# Patient Record
Sex: MALE | Race: WHITE | Employment: OTHER | URBAN - METROPOLITAN AREA
[De-identification: names, ages, dates, MRNs, and addresses within clinical notes are randomized per-mention and may not be internally consistent; named-entity substitution may affect disease eponyms.]

---

## 2019-10-28 ENCOUNTER — ANESTHESIA (OUTPATIENT)
Dept: ENDOSCOPY | Age: 79
End: 2019-10-28
Payer: MEDICARE

## 2019-10-28 ENCOUNTER — ANESTHESIA EVENT (OUTPATIENT)
Dept: ENDOSCOPY | Age: 79
End: 2019-10-28
Payer: MEDICARE

## 2019-10-28 ENCOUNTER — HOSPITAL ENCOUNTER (OUTPATIENT)
Age: 79
Setting detail: OUTPATIENT SURGERY
Discharge: HOME | End: 2019-10-28
Attending: SPECIALIST | Admitting: SPECIALIST
Payer: MEDICARE

## 2019-10-28 VITALS
HEIGHT: 70 IN | BODY MASS INDEX: 27.49 KG/M2 | SYSTOLIC BLOOD PRESSURE: 113 MMHG | TEMPERATURE: 97.8 F | DIASTOLIC BLOOD PRESSURE: 72 MMHG | OXYGEN SATURATION: 100 % | WEIGHT: 192 LBS | RESPIRATION RATE: 18 BRPM | HEART RATE: 73 BPM

## 2019-10-28 PROCEDURE — 74011250636 HC RX REV CODE- 250/636: Performed by: SPECIALIST

## 2019-10-28 PROCEDURE — 76040000007: Performed by: SPECIALIST

## 2019-10-28 PROCEDURE — 77030029384 HC SNR POLYP CAPTVR II BSC -B: Performed by: SPECIALIST

## 2019-10-28 PROCEDURE — 77030021593 HC FCPS BIOP ENDOSC BSC -A: Performed by: SPECIALIST

## 2019-10-28 PROCEDURE — 74011250636 HC RX REV CODE- 250/636: Performed by: NURSE ANESTHETIST, CERTIFIED REGISTERED

## 2019-10-28 PROCEDURE — 74011000250 HC RX REV CODE- 250: Performed by: NURSE ANESTHETIST, CERTIFIED REGISTERED

## 2019-10-28 PROCEDURE — 88305 TISSUE EXAM BY PATHOLOGIST: CPT

## 2019-10-28 PROCEDURE — 76060000032 HC ANESTHESIA 0.5 TO 1 HR: Performed by: SPECIALIST

## 2019-10-28 RX ORDER — LOSARTAN POTASSIUM AND HYDROCHLOROTHIAZIDE 12.5; 1 MG/1; MG/1
1 TABLET ORAL DAILY
COMMUNITY

## 2019-10-28 RX ORDER — SODIUM CHLORIDE 9 MG/ML
50 INJECTION, SOLUTION INTRAVENOUS CONTINUOUS
Status: DISCONTINUED | OUTPATIENT
Start: 2019-10-28 | End: 2019-10-28 | Stop reason: HOSPADM

## 2019-10-28 RX ORDER — SODIUM CHLORIDE 0.9 % (FLUSH) 0.9 %
5-40 SYRINGE (ML) INJECTION EVERY 8 HOURS
Status: DISCONTINUED | OUTPATIENT
Start: 2019-10-28 | End: 2019-10-28 | Stop reason: HOSPADM

## 2019-10-28 RX ORDER — PROPOFOL 10 MG/ML
INJECTION, EMULSION INTRAVENOUS AS NEEDED
Status: DISCONTINUED | OUTPATIENT
Start: 2019-10-28 | End: 2019-10-28 | Stop reason: HOSPADM

## 2019-10-28 RX ORDER — LIDOCAINE HYDROCHLORIDE 20 MG/ML
INJECTION, SOLUTION EPIDURAL; INFILTRATION; INTRACAUDAL; PERINEURAL AS NEEDED
Status: DISCONTINUED | OUTPATIENT
Start: 2019-10-28 | End: 2019-10-28 | Stop reason: HOSPADM

## 2019-10-28 RX ORDER — SODIUM CHLORIDE 0.9 % (FLUSH) 0.9 %
5-40 SYRINGE (ML) INJECTION AS NEEDED
Status: DISCONTINUED | OUTPATIENT
Start: 2019-10-28 | End: 2019-10-28 | Stop reason: HOSPADM

## 2019-10-28 RX ORDER — FENTANYL CITRATE 50 UG/ML
12.5-5 INJECTION, SOLUTION INTRAMUSCULAR; INTRAVENOUS
Status: DISCONTINUED | OUTPATIENT
Start: 2019-10-28 | End: 2019-10-28 | Stop reason: HOSPADM

## 2019-10-28 RX ORDER — DEXTROMETHORPHAN/PSEUDOEPHED 2.5-7.5/.8
1.2 DROPS ORAL
Status: DISCONTINUED | OUTPATIENT
Start: 2019-10-28 | End: 2019-10-28 | Stop reason: HOSPADM

## 2019-10-28 RX ORDER — ASPIRIN 81 MG/1
81 TABLET ORAL DAILY
COMMUNITY

## 2019-10-28 RX ORDER — EPINEPHRINE 0.1 MG/ML
1 INJECTION INTRACARDIAC; INTRAVENOUS
Status: DISCONTINUED | OUTPATIENT
Start: 2019-10-28 | End: 2019-10-28 | Stop reason: HOSPADM

## 2019-10-28 RX ORDER — SODIUM CHLORIDE 9 MG/ML
INJECTION, SOLUTION INTRAVENOUS
Status: DISCONTINUED | OUTPATIENT
Start: 2019-10-28 | End: 2019-10-28 | Stop reason: HOSPADM

## 2019-10-28 RX ORDER — NALOXONE HYDROCHLORIDE 0.4 MG/ML
0.4 INJECTION, SOLUTION INTRAMUSCULAR; INTRAVENOUS; SUBCUTANEOUS
Status: DISCONTINUED | OUTPATIENT
Start: 2019-10-28 | End: 2019-10-28 | Stop reason: HOSPADM

## 2019-10-28 RX ORDER — ATORVASTATIN CALCIUM 20 MG/1
20 TABLET, FILM COATED ORAL DAILY
COMMUNITY

## 2019-10-28 RX ORDER — FLUMAZENIL 0.1 MG/ML
0.2 INJECTION INTRAVENOUS
Status: DISCONTINUED | OUTPATIENT
Start: 2019-10-28 | End: 2019-10-28 | Stop reason: HOSPADM

## 2019-10-28 RX ORDER — ATROPINE SULFATE 0.1 MG/ML
0.5 INJECTION INTRAVENOUS
Status: DISCONTINUED | OUTPATIENT
Start: 2019-10-28 | End: 2019-10-28 | Stop reason: HOSPADM

## 2019-10-28 RX ORDER — MIDAZOLAM HYDROCHLORIDE 1 MG/ML
.25-5 INJECTION, SOLUTION INTRAMUSCULAR; INTRAVENOUS
Status: DISCONTINUED | OUTPATIENT
Start: 2019-10-28 | End: 2019-10-28 | Stop reason: HOSPADM

## 2019-10-28 RX ORDER — AMLODIPINE BESYLATE 5 MG/1
5 TABLET ORAL DAILY
COMMUNITY

## 2019-10-28 RX ADMIN — PROPOFOL 20 MG: 10 INJECTION, EMULSION INTRAVENOUS at 09:20

## 2019-10-28 RX ADMIN — PROPOFOL 20 MG: 10 INJECTION, EMULSION INTRAVENOUS at 09:23

## 2019-10-28 RX ADMIN — PROPOFOL 30 MG: 10 INJECTION, EMULSION INTRAVENOUS at 09:35

## 2019-10-28 RX ADMIN — PROPOFOL 50 MG: 10 INJECTION, EMULSION INTRAVENOUS at 09:41

## 2019-10-28 RX ADMIN — PROPOFOL 20 MG: 10 INJECTION, EMULSION INTRAVENOUS at 09:21

## 2019-10-28 RX ADMIN — PROPOFOL 20 MG: 10 INJECTION, EMULSION INTRAVENOUS at 09:22

## 2019-10-28 RX ADMIN — PROPOFOL 40 MG: 10 INJECTION, EMULSION INTRAVENOUS at 09:26

## 2019-10-28 RX ADMIN — LIDOCAINE HYDROCHLORIDE 80 MG: 20 INJECTION, SOLUTION EPIDURAL; INFILTRATION; INTRACAUDAL; PERINEURAL at 09:18

## 2019-10-28 RX ADMIN — PROPOFOL 80 MG: 10 INJECTION, EMULSION INTRAVENOUS at 09:18

## 2019-10-28 RX ADMIN — PROPOFOL 40 MG: 10 INJECTION, EMULSION INTRAVENOUS at 09:38

## 2019-10-28 RX ADMIN — PROPOFOL 20 MG: 10 INJECTION, EMULSION INTRAVENOUS at 09:24

## 2019-10-28 RX ADMIN — SODIUM CHLORIDE: 900 INJECTION, SOLUTION INTRAVENOUS at 09:12

## 2019-10-28 RX ADMIN — PROPOFOL 40 MG: 10 INJECTION, EMULSION INTRAVENOUS at 09:32

## 2019-10-28 RX ADMIN — PROPOFOL 20 MG: 10 INJECTION, EMULSION INTRAVENOUS at 09:19

## 2019-10-28 NOTE — ROUTINE PROCESS
Rosy Jose 1940 
[id number] Situation: 
Verbal report received from: JOSEPH Prakash Procedure: Procedure(s): 
COLONOSCOPY 
ENDOSCOPIC POLYPECTOMY Background: 
 
Preoperative diagnosis: FAMILY HISTORY OF MALIGNANT NEOPLASM OF GASTROINTESTINAL TRACT Postoperative diagnosis: 1.transverse colon polyps x 2 2.severe sigmoid diverticulosis 3.descending colon polyp :  Dr. Janine Anaya Assistant(s): Endoscopy Technician-1: Maxi Borjas Endoscopy RN-1: Erwin Sung Specimens:  
ID Type Source Tests Collected by Time Destination 1 : transverse colon polyp Preservative Colon, Transverse  Haim Tanner MD 10/28/2019 6053 Pathology 2 : descending colon polyp Preservative Colon, Descending  Haim Tanner MD 10/28/2019 7759 Pathology H. Pylori  no Assessment: 
Intra-procedure medications Anesthesia gave intra-procedure sedation and medications, see anesthesia flow sheet yes Intravenous fluids: NS@ Thora Haagensen Vital signs stable Abdominal assessment: round and soft Recommendation: 
Discharge patient per MD order. Family or Friend Permission to share finding with family or friend yes

## 2019-10-28 NOTE — H&P
Pre-endoscopy H and P for Colonoscopy    The patient was seen and examined. Date of last colonoscopy: 2013, Polyps  No      The airway was assessed and documented. The problem list, past medical history, and medications were reviewed. There is no problem list on file for this patient. Social History     Socioeconomic History    Marital status: SINGLE     Spouse name: Not on file    Number of children: Not on file    Years of education: Not on file    Highest education level: Not on file   Occupational History    Not on file   Social Needs    Financial resource strain: Not on file    Food insecurity:     Worry: Not on file     Inability: Not on file    Transportation needs:     Medical: Not on file     Non-medical: Not on file   Tobacco Use    Smoking status: Not on file   Substance and Sexual Activity    Alcohol use: Not on file    Drug use: Not on file    Sexual activity: Not on file   Lifestyle    Physical activity:     Days per week: Not on file     Minutes per session: Not on file    Stress: Not on file   Relationships    Social connections:     Talks on phone: Not on file     Gets together: Not on file     Attends [employer] service: Not on file     Active member of club or organization: Not on file     Attends meetings of clubs or organizations: Not on file     Relationship status: Not on file    Intimate partner violence:     Fear of current or ex partner: Not on file     Emotionally abused: Not on file     Physically abused: Not on file     Forced sexual activity: Not on file   Other Topics Concern    Not on file   Social History Narrative    Not on file     Past Medical History:   Diagnosis Date    Hypertension      The patient has a family history of colon ca    Prior to Admission Medications   Prescriptions Last Dose Informant Patient Reported? Taking? amLODIPine (NORVASC) 5 mg tablet 10/28/2019 at Unknown time  Yes Yes   Sig: Take 5 mg by mouth daily.    aspirin delayed-release 81 mg tablet 10/27/2019 at Unknown time  Yes Yes   Sig: Take 81 mg by mouth daily. atorvastatin (LIPITOR) 20 mg tablet 10/27/2019 at Unknown time  Yes Yes   Sig: Take 20 mg by mouth daily. losartan-hydroCHLOROthiazide (HYZAAR) 100-12.5 mg per tablet 10/28/2019 at Unknown time  Yes Yes   Sig: Take 1 Tab by mouth daily. Facility-Administered Medications: None         The review of systems is:  negative for shortness of breath or chest pain      The heart, lungs and mental status were satisfactory for the administration of MAC sedation and for the procedure. Mallampati score: See Anesthesia. I discussed with the patient the objectives, risks, consequences and alternatives to the procedure. Plan: Endoscopic procedure with MAC sedation.     Lebron Bryson MD  10/28/2019  9:11 AM

## 2019-10-28 NOTE — PROCEDURES
Colonoscopy Procedure Note    Indications:   Family history of coloretal cancer (screening only)  Referring Physician: Sesar Blair, Not On File   Anesthesia/Sedation:MAC  Endoscopist:  Dr. Solitario Clay  Assistant:  Endoscopy Technician-1: Rosita GARCIA  Endoscopy RN-1: Edin Oleary  Surgical Assistant: None  Implants: None    Preoperative diagnosis: FAMILY HISTORY OF MALIGNANT NEOPLASM OF GASTROINTESTINAL TRACT    Postoperative diagnosis: 1.transverse colon polyps x 2  2.severe sigmoid diverticulosis  3.descending colon polyp      Procedure in Detail:  Informed consent was obtained for the procedure, including sedation. Risks of perforation, hemorrhage, adverse drug reaction, and aspiration were discussed. The patient was placed in the left lateral decubitus position. Based on the pre-procedure assessment, including review of the patient's medical history, medications, allergies, and review of systems, he had been deemed to be an appropriate candidate for moderate sedation; he was therefore sedated with the medications listed above. The patient was monitored continuously with ECG tracing, pulse oximetry, blood pressure monitoring, and direct observations. A rectal examination was performed. The [id number] was inserted into the rectum and advanced under direct vision to the terminal ileum. The quality of the colonic preparation was excellent. A careful inspection was made as the colonoscope was withdrawn, including a retroflexed view of the rectum; findings and interventions are described below. Findings:   Rectum: normal  Sigmoid: severe diverticulosis;   Descending Colon: moderate diverticulosis; 2 mm polyp removed with cold snare  Transverse Colon: 2 mm polyp removed with cold forceps and 3 mm polyp removed with cold snare  Ascending Colon: normal  Cecum: normal  Terminal Ileum: normal    Specimens:     see above    EBL: None    Complications: None; patient tolerated the procedure well.      Recommendations:     - Await pathology. - If adenoma is present, repeat colonoscopy in 5 years.      - If < 10 years, reason: above average risk patient    Signed By: Didier Parra MD                        October 28, 2019

## 2019-10-28 NOTE — ANESTHESIA PREPROCEDURE EVALUATION
Relevant Problems   No relevant active problems       Anesthetic History   No history of anesthetic complications            Review of Systems / Medical History  Patient summary reviewed, nursing notes reviewed and pertinent labs reviewed    Pulmonary  Within defined limits                 Neuro/Psych   Within defined limits           Cardiovascular    Hypertension                   GI/Hepatic/Renal                Endo/Other             Other Findings              Physical Exam    Airway  Mallampati: I  TM Distance: > 6 cm  Neck ROM: normal range of motion   Mouth opening: Normal     Cardiovascular    Rhythm: regular  Rate: normal         Dental  No notable dental hx       Pulmonary  Breath sounds clear to auscultation               Abdominal         Other Findings            Anesthetic Plan    ASA: 2  Anesthesia type: MAC          Induction: Intravenous  Anesthetic plan and risks discussed with: Patient

## 2019-10-28 NOTE — PERIOP NOTES

## 2019-10-28 NOTE — ANESTHESIA POSTPROCEDURE EVALUATION
Post-Anesthesia Evaluation and Assessment    Patient: Lianna Ovalle MRN: [id number]  SSN: [id number]    YOB: 1940  Age: 67 y.o. Sex: male      I have evaluated the patient and they are stable and ready for discharge from the PACU. Cardiovascular Function/Vital Signs  Visit Vitals  /72   Pulse 73   Temp 36.6 °C (97.8 °F)   Resp 18   Ht 5' 10\" (1.778 m)   Wt 87.1 kg (192 lb)   SpO2 100%   BMI 27.55 kg/m²       Patient is status post MAC anesthesia for Procedure(s):  COLONOSCOPY  ENDOSCOPIC POLYPECTOMY. Nausea/Vomiting: None    Postoperative hydration reviewed and adequate. Pain:  Pain Scale 1: Numeric (0 - 10) (10/28/19 1009)  Pain Intensity 1: 0 (10/28/19 1009)   Managed    Neurological Status: At baseline    Mental Status, Level of Consciousness: Alert and  oriented to person, place, and time    Pulmonary Status:   O2 Device: Room air (10/28/19 1009)   Adequate oxygenation and airway patent    Complications related to anesthesia: None    Post-anesthesia assessment completed. No concerns    Signed By: Radha Schafer MD     October 28, 2019              Procedure(s):  COLONOSCOPY  ENDOSCOPIC POLYPECTOMY. MAC    <BSHSIANPOST>    Vitals Value Taken Time   BP 0/0 10/28/2019 10:19 AM   Temp 36.6 °C (97.8 °F) 10/28/2019  9:58 AM   Pulse 0 10/28/2019 10:19 AM   Resp 0 10/28/2019 10:22 AM   SpO2 100 % 10/28/2019 10:16 AM   Vitals shown include unvalidated device data.

## 2019-10-28 NOTE — DISCHARGE INSTRUCTIONS
Bethany Reeves  [id number]  1940    COLON DISCHARGE INSTRUCTIONS  Discomfort:  Redness at IV site- apply warm compress to area; if redness or soreness persist- contact your physician  There may be a slight amount of blood passed from the rectum  Gaseous discomfort- walking, belching will help relieve any discomfort  You may not operate a vehicle for 12 hours  You may not engage in an occupation involving machinery or appliances for rest of today  You may not drink alcoholic beverages for at least 12 hours  Avoid making any critical decisions for at least 24 hour  DIET:   Regular diet. - however -  remember your colon is empty and a heavy meal will produce gas. Avoid these foods:  vegetables, fried / greasy foods, carbonated drinks for today. ACTIVITY:  You may resume your normal daily activities it is recommended that you spend the remainder of the day resting -  avoid any strenuous activity. CALL M.D. ANY SIGN OF:  Increasing pain, nausea, vomiting  Abdominal distension (swelling)  New increased bleeding (oral or rectal)  Fever (chills)  Pain in chest area  Bloody discharge from nose or mouth  Shortness of breath    You may not  take any Advil, Aspirin, Ibuprofen, Motrin, Aleve, Goodys, or any similar pain or arthritis products for 3 days, ONLY  Tylenol as needed for pain. Follow-up Instructions:   Call Dr. Kalia Vaughan  Results of procedure / biopsy in 10-14days   Office telephone for problems or questions [phone]    - Await pathology. - If adenoma is present, repeat colonoscopy in 5 years. - If < 10 years, reason: above average risk patient    Patient Education        Colon Polyps: Care Instructions  Your Care Instructions    Colon polyps are growths in the colon or the rectum. The cause of most colon polyps is not known, and most people who get them do not have any problems. But a certain kind can turn into cancer.  For this reason, regular testing for colon polyps is important for people as they get older. It is also important for anyone who has an increased risk for colon cancer. Polyps are usually found through routine colon cancer screening tests. Although most colon polyps are not cancerous, they are usually removed and then tested for cancer. Screening for colon cancer saves lives because the cancer can usually be cured if it is caught early. If you have a polyp that is the type that can turn into cancer, you may need more tests to examine your entire colon. The doctor will remove any other polyps that he or she finds, and you will be tested more often. Follow-up care is a key part of your treatment and safety. Be sure to make and go to all appointments, and call your doctor if you are having problems. It's also a good idea to know your test results and keep a list of the medicines you take. How can you care for yourself at home? Regular exams to look for colon polyps are the best way to prevent polyps from turning into colon cancer. These can include stool tests, sigmoidoscopy, colonoscopy, and CT colonography. Talk with your doctor about a testing schedule that is right for you. To prevent polyps  There is no home treatment that can prevent colon polyps. But these steps may help lower your risk for cancer. · Stay active. Being active can help you get to and stay at a healthy weight. Try to exercise on most days of the week. Walking is a good choice. · Eat well. Choose a variety of vegetables, fruits, legumes (such as peas and beans), fish, poultry, and whole grains. · Do not smoke. If you need help quitting, talk to your doctor about stop-smoking programs and medicines. These can increase your chances of quitting for good. · If you drink alcohol, limit how much you drink. Limit alcohol to 2 drinks a day for men and 1 drink a day for women. When should you call for help?   Call your doctor now or seek immediate medical care if:    · You have severe belly pain.     · Your stools are maroon or very bloody.    Watch closely for changes in your health, and be sure to contact your doctor if:    · You have a fever.     · You have nausea or vomiting.     · You have a change in bowel habits (new constipation or diarrhea).     · Your symptoms get worse or are not improving as expected. Where can you learn more? Go to http://ja-deidra.info/. Enter 95 [id number] in the search box to learn more about \"Colon Polyps: Care Instructions. \"  Current as of: December 19, 2018  Content Version: 12.2  © 8235-8214 [employer]. Care instructions adapted under license by [employer] (which disclaims liability or warranty for this information). If you have questions about a medical condition or this instruction, always ask your healthcare professional. [address] any warranty or liability for your use of this information.

## 2020-02-12 ENCOUNTER — APPOINTMENT (OUTPATIENT)
Dept: CT IMAGING | Age: 80
DRG: 378 | End: 2020-02-12
Attending: EMERGENCY MEDICINE
Payer: MEDICARE

## 2020-02-12 ENCOUNTER — HOSPITAL ENCOUNTER (INPATIENT)
Age: 80
LOS: 2 days | Discharge: HOME | DRG: 378 | End: 2020-02-14
Attending: EMERGENCY MEDICINE | Admitting: FAMILY MEDICINE
Payer: MEDICARE

## 2020-02-12 DIAGNOSIS — K92.1 MELENA: Primary | ICD-10-CM

## 2020-02-12 DIAGNOSIS — K57.90 DIVERTICULOSIS: ICD-10-CM

## 2020-02-12 PROBLEM — R10.9 ABDOMINAL PAIN: Status: ACTIVE | Noted: 2020-02-12

## 2020-02-12 LAB
ALBUMIN SERPL-MCNC: 4.1 G/DL (ref 3.5–5)
ALBUMIN/GLOB SERPL: 1.1 {RATIO} (ref 1.1–2.2)
ALP SERPL-CCNC: 76 U/L (ref 45–117)
ALT SERPL-CCNC: 40 U/L (ref 12–78)
ANION GAP SERPL CALC-SCNC: 10 MMOL/L (ref 5–15)
AST SERPL-CCNC: 27 U/L (ref 15–37)
BASOPHILS # BLD: 0.1 K/UL (ref 0–0.1)
BASOPHILS NFR BLD: 1 % (ref 0–1)
BILIRUB SERPL-MCNC: 0.9 MG/DL (ref 0.2–1)
BUN SERPL-MCNC: 10 MG/DL (ref 6–20)
BUN/CREAT SERPL: 11 (ref 12–20)
CALCIUM SERPL-MCNC: 8.9 MG/DL (ref 8.5–10.1)
CHLORIDE SERPL-SCNC: 101 MMOL/L (ref 97–108)
CO2 SERPL-SCNC: 30 MMOL/L (ref 21–32)
COMMENT, HOLDF: NORMAL
CREAT SERPL-MCNC: 0.94 MG/DL (ref 0.7–1.3)
DIFFERENTIAL METHOD BLD: ABNORMAL
EOSINOPHIL # BLD: 0.3 K/UL (ref 0–0.4)
EOSINOPHIL NFR BLD: 3 % (ref 0–7)
ERYTHROCYTE [DISTWIDTH] IN BLOOD BY AUTOMATED COUNT: 12.5 % (ref 11.5–14.5)
GLOBULIN SER CALC-MCNC: 3.8 G/DL (ref 2–4)
GLUCOSE SERPL-MCNC: 109 MG/DL (ref 65–100)
HCT VFR BLD AUTO: 39 % (ref 36.6–50.3)
HCT VFR BLD AUTO: 45.6 % (ref 36.6–50.3)
HEMOCCULT STL QL: POSITIVE
HGB BLD-MCNC: 13.2 G/DL (ref 12.1–17)
HGB BLD-MCNC: 15 G/DL (ref 12.1–17)
IMM GRANULOCYTES # BLD AUTO: 0 K/UL (ref 0–0.04)
IMM GRANULOCYTES NFR BLD AUTO: 1 % (ref 0–0.5)
INR PPP: 1 (ref 0.9–1.1)
LIPASE SERPL-CCNC: 235 U/L (ref 73–393)
LYMPHOCYTES # BLD: 1.2 K/UL (ref 0.8–3.5)
LYMPHOCYTES NFR BLD: 14 % (ref 12–49)
MCH RBC QN AUTO: 32.5 PG (ref 26–34)
MCHC RBC AUTO-ENTMCNC: 32.9 G/DL (ref 30–36.5)
MCV RBC AUTO: 98.7 FL (ref 80–99)
MONOCYTES # BLD: 1 K/UL (ref 0–1)
MONOCYTES NFR BLD: 12 % (ref 5–13)
NEUTS SEG # BLD: 5.9 K/UL (ref 1.8–8)
NEUTS SEG NFR BLD: 69 % (ref 32–75)
NRBC # BLD: 0 K/UL (ref 0–0.01)
NRBC BLD-RTO: 0 PER 100 WBC
PLATELET # BLD AUTO: 191 K/UL (ref 150–400)
PMV BLD AUTO: 10.4 FL (ref 8.9–12.9)
POTASSIUM SERPL-SCNC: 3.1 MMOL/L (ref 3.5–5.1)
PROT SERPL-MCNC: 7.9 G/DL (ref 6.4–8.2)
PROTHROMBIN TIME: 9.8 SEC (ref 9–11.1)
RBC # BLD AUTO: 4.62 M/UL (ref 4.1–5.7)
SAMPLES BEING HELD,HOLD: NORMAL
SODIUM SERPL-SCNC: 141 MMOL/L (ref 136–145)
TROPONIN I SERPL-MCNC: <0.05 NG/ML
WBC # BLD AUTO: 8.4 K/UL (ref 4.1–11.1)

## 2020-02-12 PROCEDURE — 99284 EMERGENCY DEPT VISIT MOD MDM: CPT

## 2020-02-12 PROCEDURE — 74011636320 HC RX REV CODE- 636/320: Performed by: EMERGENCY MEDICINE

## 2020-02-12 PROCEDURE — 74177 CT ABD & PELVIS W/CONTRAST: CPT

## 2020-02-12 PROCEDURE — 65660000000 HC RM CCU STEPDOWN

## 2020-02-12 PROCEDURE — 84484 ASSAY OF TROPONIN QUANT: CPT

## 2020-02-12 PROCEDURE — C9113 INJ PANTOPRAZOLE SODIUM, VIA: HCPCS | Performed by: EMERGENCY MEDICINE

## 2020-02-12 PROCEDURE — 85018 HEMOGLOBIN: CPT

## 2020-02-12 PROCEDURE — 74011250636 HC RX REV CODE- 250/636: Performed by: EMERGENCY MEDICINE

## 2020-02-12 PROCEDURE — 83690 ASSAY OF LIPASE: CPT

## 2020-02-12 PROCEDURE — 74011250636 HC RX REV CODE- 250/636: Performed by: FAMILY MEDICINE

## 2020-02-12 PROCEDURE — 82272 OCCULT BLD FECES 1-3 TESTS: CPT

## 2020-02-12 PROCEDURE — 36415 COLL VENOUS BLD VENIPUNCTURE: CPT

## 2020-02-12 PROCEDURE — 80053 COMPREHEN METABOLIC PANEL: CPT

## 2020-02-12 PROCEDURE — 74011000250 HC RX REV CODE- 250: Performed by: EMERGENCY MEDICINE

## 2020-02-12 PROCEDURE — 85610 PROTHROMBIN TIME: CPT

## 2020-02-12 PROCEDURE — 85025 COMPLETE CBC W/AUTO DIFF WBC: CPT

## 2020-02-12 RX ORDER — POTASSIUM CHLORIDE 7.45 MG/ML
10 INJECTION INTRAVENOUS
Status: COMPLETED | OUTPATIENT
Start: 2020-02-12 | End: 2020-02-13

## 2020-02-12 RX ORDER — SODIUM CHLORIDE 9 MG/ML
75 INJECTION, SOLUTION INTRAVENOUS CONTINUOUS
Status: DISCONTINUED | OUTPATIENT
Start: 2020-02-12 | End: 2020-02-14 | Stop reason: HOSPADM

## 2020-02-12 RX ORDER — TRAZODONE HYDROCHLORIDE 100 MG/1
100 TABLET ORAL
COMMUNITY

## 2020-02-12 RX ORDER — SODIUM CHLORIDE 0.9 % (FLUSH) 0.9 %
5-40 SYRINGE (ML) INJECTION AS NEEDED
Status: DISCONTINUED | OUTPATIENT
Start: 2020-02-12 | End: 2020-02-14 | Stop reason: HOSPADM

## 2020-02-12 RX ORDER — SODIUM CHLORIDE 0.9 % (FLUSH) 0.9 %
5-40 SYRINGE (ML) INJECTION EVERY 8 HOURS
Status: DISCONTINUED | OUTPATIENT
Start: 2020-02-12 | End: 2020-02-14 | Stop reason: HOSPADM

## 2020-02-12 RX ORDER — SODIUM CHLORIDE 9 MG/ML
50 INJECTION, SOLUTION INTRAVENOUS ONCE
Status: COMPLETED | OUTPATIENT
Start: 2020-02-12 | End: 2020-02-12

## 2020-02-12 RX ORDER — SODIUM CHLORIDE 0.9 % (FLUSH) 0.9 %
10 SYRINGE (ML) INJECTION ONCE
Status: COMPLETED | OUTPATIENT
Start: 2020-02-12 | End: 2020-02-12

## 2020-02-12 RX ORDER — HYDRALAZINE HYDROCHLORIDE 20 MG/ML
10 INJECTION INTRAMUSCULAR; INTRAVENOUS
Status: DISCONTINUED | OUTPATIENT
Start: 2020-02-12 | End: 2020-02-14 | Stop reason: HOSPADM

## 2020-02-12 RX ADMIN — Medication 10 ML: at 16:56

## 2020-02-12 RX ADMIN — Medication 10 ML: at 22:48

## 2020-02-12 RX ADMIN — IOPAMIDOL 100 ML: 755 INJECTION, SOLUTION INTRAVENOUS at 16:56

## 2020-02-12 RX ADMIN — SODIUM CHLORIDE 40 MG: 9 INJECTION INTRAMUSCULAR; INTRAVENOUS; SUBCUTANEOUS at 18:54

## 2020-02-12 RX ADMIN — SODIUM CHLORIDE 75 ML/HR: 900 INJECTION, SOLUTION INTRAVENOUS at 22:48

## 2020-02-12 RX ADMIN — SODIUM CHLORIDE 50 ML/HR: 900 INJECTION, SOLUTION INTRAVENOUS at 16:56

## 2020-02-12 RX ADMIN — POTASSIUM CHLORIDE 10 MEQ: 7.46 INJECTION, SOLUTION INTRAVENOUS at 22:48

## 2020-02-12 RX ADMIN — SODIUM CHLORIDE 1000 ML: 900 INJECTION, SOLUTION INTRAVENOUS at 16:23

## 2020-02-12 NOTE — ED NOTES
TRANSFER - OUT REPORT:    Verbal report given to ALCIDES MENDEZ [facility]) on Theodore Albarran  being transferred to 508(unit) for routine progression of care       Report consisted of patients Situation, Background, Assessment and   Recommendations(SBAR). Information from the following report(s) SBAR, Kardex, ED Summary, [facility] and Recent Results was reviewed with the receiving nurse. Lines:   Peripheral IV 02/12/20 Right Forearm (Active)   Site Assessment Clean, dry, & intact 2/12/2020  4:00 PM   Dressing Status Clean, dry, & intact 2/12/2020  4:00 PM        Opportunity for questions and clarification was provided.       Patient transported with:   Monitor

## 2020-02-12 NOTE — ED TRIAGE NOTES
TRIAGE NOTE: Rectal bleeding Friday,Tuesday pcp said go ED (didn't go). Dark tarry stool. Pt reports he feels fine.

## 2020-02-12 NOTE — ED PROVIDER NOTES
52-year-old male with a history of HTN, HLD, prediabetes, who drinks alcohol regularly, and was on 81 mg aspirin until yesterday, with occasional NSAID use, but no other anticoagulant medications, or history of prior GI bleeds presents to the emergency department noting the onset of bright red blood per rectum on 2/7/20 that then since the weekend has evolved into dark tarry black stools. He denies any history of hemorrhoids or other prior GI bleeding. He notes a history of prior diverticulitis. He is a patient of , had a colonoscopy done in October with polyps removed. Other than the bleeding patient denies any symptoms stating that he feels fine without any chest pain, shortness of breath, lightheadedness, syncope, or any other medical concerns. Past Medical History:   Diagnosis Date    High cholesterol     Hypertension     Prediabetes        Past Surgical History:   Procedure Laterality Date    COLONOSCOPY Left 10/28/2019    [facility]an [facility]aro [facility] at [facility] ENDOSCOPY    HX ORTHOPAEDIC      bilater toe removal in trhe [de-identified]         Family History:   Problem Relation Age of Onset    Colon Cancer Mother        Social History     Socioeconomic History    Marital status: SINGLE     Spouse name: Not on file    Number of children: Not on file    Years of education: Not on file    Highest education level: Not on file   Occupational History    Not on file   Social Needs    Financial resource strain: Not on file    Food insecurity:     Worry: Not on file     Inability: Not on file    Transportation needs:     Medical: Not on file     Non-medical: Not on file   Tobacco Use    Smoking status: Former Smoker   Substance and Sexual Activity    Alcohol use:  Yes     Alcohol/week: 21.0 standard drinks     Types: 21 Cans of beer per week     Comment: 2-3 day    Drug use: Not on file    Sexual activity: Not on file   Lifestyle    Physical activity:     Days per week: Not on file     Minutes per session: Not on file    Stress: Not on file   Relationships    Social connections:     Talks on phone: Not on file     Gets together: Not on file     Attends Gnosticism service: Not on file     Active member of club or organization: Not on file     Attends meetings of clubs or organizations: Not on file     Relationship status: Not on file    Intimate partner violence:     Fear of current or ex partner: Not on file     Emotionally abused: Not on file     Physically abused: Not on file     Forced sexual activity: Not on file   Other Topics Concern    Not on file   Social History Narrative    Not on file         ALLERGIES: Penicillins    Review of Systems   Constitutional: Negative for activity change, appetite change, chills and fever. HENT: Negative for congestion, rhinorrhea, sinus pain, sneezing and sore throat. Eyes: Negative for photophobia and visual disturbance. Respiratory: Negative for cough and shortness of breath. Cardiovascular: Negative for chest pain. Gastrointestinal: Positive for blood in stool. Negative for abdominal pain, constipation, diarrhea, nausea and vomiting. Genitourinary: Negative for difficulty urinating, dysuria, flank pain, hematuria, penile pain and testicular pain. Musculoskeletal: Negative for arthralgias, back pain, myalgias and neck pain. Skin: Negative for rash and wound. Neurological: Negative for syncope, weakness, light-headedness, numbness and headaches. Psychiatric/Behavioral: Negative for self-injury and suicidal ideas. All other systems reviewed and are negative. Vitals:    02/12/20 1602   BP: 147/76   Pulse: 80   Resp: 18   Temp: 97.5 °F (36.4 °C)   SpO2: 100%   Weight: 88 kg (194 lb 0.1 oz)            Physical Exam  Vitals signs and nursing note reviewed. Constitutional:       General: He is not in acute distress. Appearance: Normal appearance. He is well-developed. He is not diaphoretic.    HENT:      Head: Normocephalic and atraumatic. Nose: Nose normal.   Eyes:      Extraocular Movements: Extraocular movements intact. Conjunctiva/sclera: Conjunctivae normal.      Pupils: Pupils are equal, round, and reactive to light. Neck:      Musculoskeletal: Neck supple. Cardiovascular:      Rate and Rhythm: Normal rate and regular rhythm. Heart sounds: Normal heart sounds. Pulmonary:      Effort: Pulmonary effort is normal.      Breath sounds: Normal breath sounds. Abdominal:      General: There is no distension. Palpations: Abdomen is soft. Tenderness: There is no abdominal tenderness. Genitourinary:     Rectum: Guaiac result positive. Comments: Cornel Novoa melena noted on rectal exam  No evidence of external hemorrhoids or abscess. Musculoskeletal:         General: No tenderness. Skin:     General: Skin is warm and dry. Neurological:      General: No focal deficit present. Mental Status: He is alert and oriented to person, place, and time. Cranial Nerves: No cranial nerve deficit. Sensory: No sensory deficit. Motor: No weakness. Coordination: Coordination normal.          MDM   67-year-old gentleman presents with melanotic stools but otherwise vital signs stable with unremarkable blood work. Given IV fluid bolus in the ED and dose of IV Protonix. Guaiac positive    CT abdomen pelvis shows diverticulosis but no obvious source of GI bleed. He was monitored in the ED with no significant change in his vital signs. Procedures  Total critical care time spent exclusive of procedures:  40 minutes      5:30 discussed case with Dr. Tess Stroud, GI, who agrees with plan to admit to [facility] for observation and endoscopy tomorrow. [facility]  5:44 PM    ED Room Number: SER01/01  Patient Name and age:  Pura Connell 67 y.o.  male  Working Diagnosis:   1. Melena    2.  Diverticulosis      Readmission: no  Isolation Requirements: no  Recommended Level of Care:  telemetry  Code Status:  Full Code  Department:[facility] - [phone]  Other:  Frankly melanotic stools, but reassuring labs, Hemodynamically stable in NAD. GI is on board and agrees with scoping him probably tomorrow.

## 2020-02-13 ENCOUNTER — ANESTHESIA EVENT (OUTPATIENT)
Dept: ENDOSCOPY | Age: 80
DRG: 378 | End: 2020-02-13
Payer: MEDICARE

## 2020-02-13 ENCOUNTER — ANESTHESIA (OUTPATIENT)
Dept: ENDOSCOPY | Age: 80
DRG: 378 | End: 2020-02-13
Payer: MEDICARE

## 2020-02-13 LAB
ANION GAP SERPL CALC-SCNC: 6 MMOL/L (ref 5–15)
BUN SERPL-MCNC: 7 MG/DL (ref 6–20)
BUN/CREAT SERPL: 8 (ref 12–20)
CALCIUM SERPL-MCNC: 8.3 MG/DL (ref 8.5–10.1)
CHLORIDE SERPL-SCNC: 109 MMOL/L (ref 97–108)
CO2 SERPL-SCNC: 26 MMOL/L (ref 21–32)
CREAT SERPL-MCNC: 0.89 MG/DL (ref 0.7–1.3)
ERYTHROCYTE [DISTWIDTH] IN BLOOD BY AUTOMATED COUNT: 12.6 % (ref 11.5–14.5)
GLUCOSE SERPL-MCNC: 113 MG/DL (ref 65–100)
HCT VFR BLD AUTO: 39.1 % (ref 36.6–50.3)
HCT VFR BLD AUTO: 39.6 % (ref 36.6–50.3)
HGB BLD-MCNC: 13.3 G/DL (ref 12.1–17)
HGB BLD-MCNC: 13.6 G/DL (ref 12.1–17)
MCH RBC QN AUTO: 33.2 PG (ref 26–34)
MCHC RBC AUTO-ENTMCNC: 34 G/DL (ref 30–36.5)
MCV RBC AUTO: 97.5 FL (ref 80–99)
NRBC # BLD: 0 K/UL (ref 0–0.01)
NRBC BLD-RTO: 0 PER 100 WBC
PLATELET # BLD AUTO: 157 K/UL (ref 150–400)
PMV BLD AUTO: 10.7 FL (ref 8.9–12.9)
POTASSIUM SERPL-SCNC: 3.4 MMOL/L (ref 3.5–5.1)
RBC # BLD AUTO: 4.01 M/UL (ref 4.1–5.7)
SODIUM SERPL-SCNC: 141 MMOL/L (ref 136–145)
WBC # BLD AUTO: 6.6 K/UL (ref 4.1–11.1)

## 2020-02-13 PROCEDURE — 74011250636 HC RX REV CODE- 250/636: Performed by: NURSE ANESTHETIST, CERTIFIED REGISTERED

## 2020-02-13 PROCEDURE — 74011000250 HC RX REV CODE- 250: Performed by: FAMILY MEDICINE

## 2020-02-13 PROCEDURE — 74011250636 HC RX REV CODE- 250/636: Performed by: FAMILY MEDICINE

## 2020-02-13 PROCEDURE — 76060000031 HC ANESTHESIA FIRST 0.5 HR: Performed by: SPECIALIST

## 2020-02-13 PROCEDURE — 65660000000 HC RM CCU STEPDOWN

## 2020-02-13 PROCEDURE — 74011000250 HC RX REV CODE- 250: Performed by: NURSE ANESTHETIST, CERTIFIED REGISTERED

## 2020-02-13 PROCEDURE — 76040000019: Performed by: SPECIALIST

## 2020-02-13 PROCEDURE — 80048 BASIC METABOLIC PNL TOTAL CA: CPT

## 2020-02-13 PROCEDURE — 74011250637 HC RX REV CODE- 250/637: Performed by: INTERNAL MEDICINE

## 2020-02-13 PROCEDURE — 36415 COLL VENOUS BLD VENIPUNCTURE: CPT

## 2020-02-13 PROCEDURE — 85018 HEMOGLOBIN: CPT

## 2020-02-13 PROCEDURE — 0DJ08ZZ INSPECTION OF UPPER INTESTINAL TRACT, VIA NATURAL OR ARTIFICIAL OPENING ENDOSCOPIC: ICD-10-PCS | Performed by: SPECIALIST

## 2020-02-13 PROCEDURE — 85027 COMPLETE CBC AUTOMATED: CPT

## 2020-02-13 RX ORDER — SODIUM CHLORIDE 0.9 % (FLUSH) 0.9 %
5-40 SYRINGE (ML) INJECTION AS NEEDED
Status: CANCELLED | OUTPATIENT
Start: 2020-02-13

## 2020-02-13 RX ORDER — FENTANYL CITRATE 50 UG/ML
12.5-5 INJECTION, SOLUTION INTRAMUSCULAR; INTRAVENOUS
Status: CANCELLED | OUTPATIENT
Start: 2020-02-13 | End: 2020-02-13

## 2020-02-13 RX ORDER — FLUMAZENIL 0.1 MG/ML
0.2 INJECTION INTRAVENOUS
Status: CANCELLED | OUTPATIENT
Start: 2020-02-13 | End: 2020-02-13

## 2020-02-13 RX ORDER — NALOXONE HYDROCHLORIDE 0.4 MG/ML
0.4 INJECTION, SOLUTION INTRAMUSCULAR; INTRAVENOUS; SUBCUTANEOUS
Status: CANCELLED | OUTPATIENT
Start: 2020-02-13 | End: 2020-02-13

## 2020-02-13 RX ORDER — LIDOCAINE HYDROCHLORIDE 20 MG/ML
INJECTION, SOLUTION EPIDURAL; INFILTRATION; INTRACAUDAL; PERINEURAL AS NEEDED
Status: DISCONTINUED | OUTPATIENT
Start: 2020-02-13 | End: 2020-02-13 | Stop reason: HOSPADM

## 2020-02-13 RX ORDER — SODIUM CHLORIDE 0.9 % (FLUSH) 0.9 %
5-40 SYRINGE (ML) INJECTION EVERY 8 HOURS
Status: CANCELLED | OUTPATIENT
Start: 2020-02-13

## 2020-02-13 RX ORDER — EPINEPHRINE 0.1 MG/ML
1 INJECTION INTRACARDIAC; INTRAVENOUS
Status: CANCELLED | OUTPATIENT
Start: 2020-02-13 | End: 2020-02-14

## 2020-02-13 RX ORDER — SODIUM CHLORIDE 9 MG/ML
INJECTION, SOLUTION INTRAVENOUS
Status: DISCONTINUED | OUTPATIENT
Start: 2020-02-13 | End: 2020-02-13 | Stop reason: HOSPADM

## 2020-02-13 RX ORDER — ATROPINE SULFATE 0.1 MG/ML
0.5 INJECTION INTRAVENOUS
Status: CANCELLED | OUTPATIENT
Start: 2020-02-13 | End: 2020-02-14

## 2020-02-13 RX ORDER — PROPOFOL 10 MG/ML
INJECTION, EMULSION INTRAVENOUS AS NEEDED
Status: DISCONTINUED | OUTPATIENT
Start: 2020-02-13 | End: 2020-02-13 | Stop reason: HOSPADM

## 2020-02-13 RX ORDER — MIDAZOLAM HYDROCHLORIDE 1 MG/ML
.25-1 INJECTION, SOLUTION INTRAMUSCULAR; INTRAVENOUS
Status: CANCELLED | OUTPATIENT
Start: 2020-02-13 | End: 2020-02-13

## 2020-02-13 RX ORDER — SODIUM CHLORIDE 9 MG/ML
50 INJECTION, SOLUTION INTRAVENOUS CONTINUOUS
Status: CANCELLED | OUTPATIENT
Start: 2020-02-13 | End: 2020-02-13

## 2020-02-13 RX ORDER — GLYCOPYRROLATE 0.2 MG/ML
INJECTION INTRAMUSCULAR; INTRAVENOUS AS NEEDED
Status: DISCONTINUED | OUTPATIENT
Start: 2020-02-13 | End: 2020-02-13 | Stop reason: HOSPADM

## 2020-02-13 RX ORDER — POTASSIUM CHLORIDE 750 MG/1
40 TABLET, FILM COATED, EXTENDED RELEASE ORAL
Status: COMPLETED | OUTPATIENT
Start: 2020-02-13 | End: 2020-02-13

## 2020-02-13 RX ORDER — DEXTROMETHORPHAN/PSEUDOEPHED 2.5-7.5/.8
1.2 DROPS ORAL
Status: CANCELLED | OUTPATIENT
Start: 2020-02-13

## 2020-02-13 RX ORDER — LORAZEPAM 2 MG/ML
2 INJECTION INTRAMUSCULAR AS NEEDED
Status: CANCELLED | OUTPATIENT
Start: 2020-02-13 | End: 2020-02-13

## 2020-02-13 RX ADMIN — FOLIC ACID: 5 INJECTION, SOLUTION INTRAMUSCULAR; INTRAVENOUS; SUBCUTANEOUS at 12:39

## 2020-02-13 RX ADMIN — GLYCOPYRROLATE 0.4 MG: 0.2 INJECTION, SOLUTION INTRAMUSCULAR; INTRAVENOUS at 16:54

## 2020-02-13 RX ADMIN — POTASSIUM CHLORIDE 10 MEQ: 7.46 INJECTION, SOLUTION INTRAVENOUS at 00:03

## 2020-02-13 RX ADMIN — SODIUM CHLORIDE: 900 INJECTION, SOLUTION INTRAVENOUS at 16:20

## 2020-02-13 RX ADMIN — PROPOFOL 30 MG: 10 INJECTION, EMULSION INTRAVENOUS at 16:57

## 2020-02-13 RX ADMIN — PROPOFOL 50 MG: 10 INJECTION, EMULSION INTRAVENOUS at 16:55

## 2020-02-13 RX ADMIN — PROPOFOL 50 MG: 10 INJECTION, EMULSION INTRAVENOUS at 16:51

## 2020-02-13 RX ADMIN — LIDOCAINE HYDROCHLORIDE 40 MG: 20 INJECTION, SOLUTION EPIDURAL; INFILTRATION; INTRACAUDAL; PERINEURAL at 16:51

## 2020-02-13 RX ADMIN — POTASSIUM CHLORIDE 40 MEQ: 750 TABLET, FILM COATED, EXTENDED RELEASE ORAL at 10:31

## 2020-02-13 NOTE — H&P
[address]  HISTORY AND PHYSICAL    Name:  Efrain Bello  MR#:  [id number]  :  1940  ACCOUNT #:  [de-identified]  ADMIT DATE:  2020      CHIEF COMPLAINT:  Gastrointestinal bleed. HISTORY OF PRESENT ILLNESS:  The patient is a 61-year-old gentleman with past medical history of hypertension, hyperlipidemia, prediabetes, who presents to the hospital with the above-mentioned symptoms. The patient reports that about 3 days back he started having some bright red blood per rectum; post that, started having some maroon blood. The patient reports that he went to see his primary care doctor yesterday, was told to come to the ER, but he did not. He took one dose of aspirin yesterday, but did not take aspirin today. Continued to have bleeding, got concerned, and decided to come to [facility]. From there, he was sent to the [facility].  The patient reports that he had a colonoscopy through Dr. Brinda Morris in October for polyp removal and since then had been okay. The patient denies any hematemesis associated with his symptoms. Denies any abdominal pain. He also denies any headache, blurry vision, sore throat, trouble swallowing, trouble with speech, chest pain, shortness of breath, cough, fever, chills, abdominal pain, constipation, diarrhea, urinary symptoms, focal or generalized neurological weakness, recent travel, sick contacts, falls, injuries, hematemesis, does admit to melena, but denies any hemoptysis or hematuria. PAST MEDICAL HISTORY:  See above. HOME MEDICATIONS:  Currently, the patient is on:  1. Trazodone 100 mg nightly. 2.  Aspirin 81 mg daily. 3.  Losartan/hydrochlorothiazide one tablet daily. 4.  Amlodipine 5 mg daily. 5.  Lipitor 20 mg daily. SOCIAL HISTORY:  Drinks 2-4 shots of [place] on a daily basis. Former smoker. No IV drug abuse. ALLERGIES:  PENICILLIN. FAMILY HISTORY:  Mother has history of colon cancer.     REVIEW OF SYMPTOMS:  All systems were reviewed and found to be essentially negative except for the symptoms mentioned above. PHYSICAL EXAMINATION:  VITAL SIGNS:  Temperature 98.1, pulse 79, respiratory rate of 16, blood pressure 141/76, pulse oximetry 97% on room air. GENERAL:  Alert x3, awake, in no acute distress, resting in bed, pleasant male, appears to be stated age. HEENT:  Pupils equal and reactive to light. Dry mucous membranes. Tympanic membranes clear. NECK:  Supple. CHEST:  Clear to auscultation bilaterally. CORONARY:  S1 and S2 were heard. ABDOMEN:  Soft, nontender, nondistended. Bowel sounds are physiological.  EXTREMITIES:  No clubbing, no cyanosis, no edema. NEURO/PSYCH:  Pleasant mood and affect. Cranial nerves II through XII are grossly intact. Sensory grossly within normal limits. DTRs 2+ x4. Strength 5/5. SKIN:  Warm. LABORATORY DATA:  White count 8.4, hemoglobin 15, hematocrit 45.6, platelets 316. INR 1. Sodium 141, potassium 3.1, chloride 101, bicarbonate 30, anion gap 10, glucose 109, BUN 10, creatinine 0.94, calcium 8.9, bilirubin total 0.9, ALT 40, AST 27, alkaline phosphatase 96. Lipase 235. Troponin less than 0.05. Stool occult blood was positive. CT of the abdomen and pelvis shows colonic diverticulosis with no other finding to correlate with lower gastrointestinal hemorrhage. ASSESSMENT AND PLAN:  1. Lower gastrointestinal bleed. The patient will be admitted on a telemetry bed. Keep the patient n.p.o. for possible colonoscopy in the morning. We will monitor H and H every 8 hours and transfuse further as needed, provide gentle IV hydration, repeat labs in the morning, and continue to closely monitor. Further intervention will be per hospital course and Gastroenterology. Any hemorrhagic bleeding will mandate emergent imaging. We will monitor for now. 2.  Hypokalemia, replace potassium. 3.  Hypertension.   We will hold oral medications in light of gastrointestinal bleeding and we will put the patient on hydralazine p.r.n. Continue to closely monitor. 4.  Hypercholesterolemia. Hold statin for now. 5.  Gastrointestinal and deep venous thrombosis prophylaxis. The patient will be on sequential compression devices.       Sourav Ruiz MD [email or web address]  D:  02/12/2020 21:36  T:  02/12/2020 23:22  JOB #:  [id number]

## 2020-02-13 NOTE — ANESTHESIA PREPROCEDURE EVALUATION
Relevant Problems   No relevant active problems       Anesthetic History   No history of anesthetic complications            Review of Systems / Medical History  Patient summary reviewed, nursing notes reviewed and pertinent labs reviewed    Pulmonary  Within defined limits                 Neuro/Psych   Within defined limits           Cardiovascular    Hypertension: well controlled                   GI/Hepatic/Renal  Within defined limits              Endo/Other  Within defined limits           Other Findings              Physical Exam    Airway  Mallampati: II  TM Distance: > 6 cm  Neck ROM: normal range of motion   Mouth opening: Normal     Cardiovascular  Regular rate and rhythm,  S1 and S2 normal,  no murmur, click, rub, or gallop             Dental  No notable dental hx       Pulmonary  Breath sounds clear to auscultation               Abdominal  GI exam deferred       Other Findings            Anesthetic Plan    ASA: 2  Anesthesia type: MAC            Anesthetic plan and risks discussed with: Patient

## 2020-02-13 NOTE — PROGRESS NOTES
Bedside shift change report given to Nav Peña RN (oncoming nurse) by Kosta Mercer RN (offgoing nurse). Report included the following information SBAR, Kardex, ED Summary, Intake/Output, MAR and Recent Results.

## 2020-02-13 NOTE — PROGRESS NOTES
[address] [facility] Adult  Hospitalist Group                                                                                          Hospitalist Progress Note  Cipriano MoralesDO  Answering service: [phone] from in house phone        Date of Service:  2020  NAME:  Martina Robles  :  1940  MRN:  [id number]      Admission Summary:   50-year-old male presented to [facility] with maroon stool. Admitted for possible GI bleed. Interval history / Subjective:   Patient seen and examined prior to EGD. Reviewed admission history. No further stools since admission, remained n.p.o.  EGD demonstrated erosive distal esophagitis. Assessment & Plan:     GI bleed:  Acute blood loss anemia:   -Suspected diverticular bleed  -EGD with erosive distal esophagitis  -Continue PPI  -Clear liquid diet, advance as tolerated  -Outpatient follow-up EGD/colonoscopy    Hypokalemia: Replete  Hypertension: Holding home meds, initiate if needed  Hyperlipidemia: Hold home statin    Code status: full  DVT prophylaxis: holding    Care Plan discussed with: Patient/Family  Anticipated Disposition: Home w/Family  Anticipated Discharge: Less than 24 hours     Hospital Problems  Never Reviewed          Codes Class Noted POA    Abdominal pain ICD-10-CM: R10.9  ICD-9-CM: 789.00  2020 Unknown                Review of Systems:   Negative unless stated above      Vital Signs:    Last 24hrs VS reviewed since prior progress note. Most recent are:  Visit Vitals  /79   Pulse 79   Temp 98.3 °F (36.8 °C)   Resp 19   Ht 5' 10\" (1.778 m)   Wt 88 kg (194 lb)   SpO2 99%   BMI 27.84 kg/m²         Intake/Output Summary (Last 24 hours) at 2020 1829  Last data filed at 2020 1500  Gross per 24 hour   Intake    Output 500 ml   Net -500 ml        Physical Examination:             Constitutional:  No acute distress, cooperative, pleasant    ENT:  Oral mucosa moist, oropharynx benign.     Resp:  CTA bilaterally. No wheezing/rhonchi/rales. No accessory muscle use   CV:  Regular rhythm, normal rate, no murmurs, gallops, rubs    GI:  Soft, non distended, non tender. normoactive bowel sounds, no hepatosplenomegaly     Musculoskeletal:  No edema, warm, 2+ pulses throughout    Neurologic:  Moves all extremities. Data Review:    Review and/or order of clinical lab test      Labs:     Recent Labs     02/13/20  1424 02/13/20  0555  02/12/20  1604   WBC  --  6.6  --  8.4   HGB 13.6 13.3   < > 15.0   HCT 39.6 39.1   < > 45.6   PLT  --  157  --  191    < > = values in this interval not displayed. Recent Labs     02/13/20  0555 02/12/20  1604    141   K 3.4* 3.1*   * 101   CO2 26 30   BUN 7 10   CREA 0.89 0.94   * 109*   CA 8.3* 8.9     Recent Labs     02/12/20  1604   SGOT 27   ALT 40   AP 76   TBILI 0.9   TP 7.9   ALB 4.1   GLOB 3.8   LPSE 235     Recent Labs     02/12/20  1604   INR 1.0   PTP 9.8      No results for input(s): FE, TIBC, PSAT, FERR in the last 72 hours. No results found for: FOL, RBCF   No results for input(s): PH, PCO2, PO2 in the last 72 hours. Recent Labs     02/12/20  1604   TROIQ <0.05     No results found for: CHOL, CHOLX, CHLST, CHOLV, HDL, HDLP, LDL, LDLC, DLDLP, TGLX, TRIGL, TRIGP, CHHD, CHHDX  No results found for: GLUCPOC  No results found for: COLOR, APPRN, SPGRU, REFSG, STEPHIE, PROTU, GLUCU, KETU, BILU, UROU, CORNELIO, LEUKU, GLUKE, EPSU, BACTU, WBCU, RBCU, CASTS, UCRY      Medications Reviewed:     Current Facility-Administered Medications   Medication Dose Route Frequency    sodium chloride (NS) flush 5-40 mL  5-40 mL IntraVENous Q8H    sodium chloride (NS) flush 5-40 mL  5-40 mL IntraVENous PRN    0.9% sodium chloride infusion  75 mL/hr IntraVENous CONTINUOUS    0.9% sodium chloride 7,916 mL with folic acid 1 mg, thiamine 100 mg, mvi, adult no. 4 with vit K 10 mL infusion   IntraVENous DAILY    hydrALAZINE (APRESOLINE) 20 mg/mL injection 10 mg  10 mg IntraVENous Q6H PRN     ______________________________________________________________________  EXPECTED LENGTH OF STAY: - - -  ACTUAL LENGTH OF STAY:          [address],

## 2020-02-13 NOTE — PROGRESS NOTES
This RN spoke to GI doctor on call for Dr. Gini Mcnair regarding Pt's status. RN told to keep Pt NPO after midnight and GI will come see him in the morning to decide whether or not he needs a colonoscopy or possibly an EGD done first. RN to communicate this update to Dr. Salvador Stallworth.

## 2020-02-13 NOTE — PERIOP NOTES

## 2020-02-13 NOTE — PROCEDURES
EGD Procedure Note    Indications:  Melena/hematochezia   Referring Physician: Kodak Vega MD   Anesthesia/Sedation:MAC  Endoscopist:  Dr. Mariann Gibson  Assistant:  Endoscopy Technician-1: Trinidad Hernandez  Endoscopy RN-1: Simeon Nino RN  Surgical Assistant: None  Implants: None      Preoperative diagnosis: hematochezia    Postoperative diagnosis: erosive esophagitis      Procedure in Detail:  Informed consent was obtained for the procedure, including sedation. Risks of perforation, hemorrhage, adverse drug reaction, and aspiration were discussed. The patient was placed in the left lateral decubitus position. Based on the pre-procedure assessment, including review of the patient's medical history, medications, allergies, and review of systems, he had been deemed to be an appropriate candidate for moderate sedation; he was therefore sedated with the medications listed above. The patient was monitored continuously with ECG tracing, pulse oximetry, blood pressure monitoring, and direct observations. An Olympus video endoscope was inserted into the mouth and advanced under direct vision to into the esophagus, then stomach and duodenum. A careful inspection was made as the gastroscope was withdrawn, including a retroflexed view of the proximal stomach; findings and interventions are described below. Findings:   Esophagus:chronic erosive distal esophagitis - not bx  Stomach: normal   Duodenum/jejunum: normal    Therapies:  none    Specimens: none           EBL: None    Complications:   None; patient tolerated the procedure well. Recommendations:  -Acid suppression with a proton pump inhibitor. , -No NSAIDS, -no alcohol follow up in 8 weeks for repeat EGD, colonoscopy electively suspect this was diverticular bleed, sustained lip laceration due to Nicanor Westbrook MD

## 2020-02-13 NOTE — CONSULTS
[facility].  [address] [facility] [address]  [address] [facility], [address]  [phone]                     GI CONSULTATION NOTE  Shell Soares, [facility]  Work Cell: [phone]      NAME:  Bianca Kidd   :   1940   MRN:   [id number]       Referring Provider: Dr Nghia Desai    Consult Date: 2020     Chief Complaint: Hematochezia    History of Present Illness: The patient is a 30-year-old gentleman with past medical history of hypertension, hyperlipidemia, prediabetes, who presents to the hospital with hematochezia. The patient reports that on 20, he started having some bright red blood per rectum (approximately half a cup), on Saturday it was maroon and then it became black. His last black bowel movement was yesterday at home. The patient reports that he went to see his primary care doctor on Tuesday and was told to come to the ER, but he did not. He had some blood work done and a CT scan at [facility]. From there, he was sent to the [facility] [place]. His last bowel movement was yesterday morning at home. No bowel movement today. Current Hgb 13.3. The patient reports that he had a colonoscopy through Dr. Sridevi Johnson in October for polyp removal.   He does not have any nausea or vomiting. He is asymptomatic. Denies any abdominal pain. No recent travel, sick contacts, falls, injuries, hematemesis. 81 mg aspirin was stopped yesterday. No other blood thinner. He drinks about 3-4 shots of bourbon on regular basis but not everyday. Findings from October colonoscopy:  Rectum: normal  Sigmoid: severe diverticulosis;   Descending Colon: moderate diverticulosis; 2 mm polyp removed with cold snare  Transverse Colon: 2 mm polyp removed with cold forceps and 3 mm polyp removed  with cold snare  Ascending Colon: normal  Cecum: normal  Terminal Ileum: normal    CT showed:  IMPRESSION: Colonic diverticulosis with no other finding to correlate with lower  gastric intestinal hemorrhage. Incidentals as above including  cholecystolithiasis and nonobstructing left nephrolithiasis. PMH:  Past Medical History:   Diagnosis Date    High cholesterol     Hypertension     Prediabetes        PSH:  Past Surgical History:   Procedure Laterality Date    COLONOSCOPY Left 10/28/2019    COLONOSCOPY performed by Kalyan Lazaro MD at [address] [facility]      bilater toe removal in trhe 80's       Allergies: Allergies   Allergen Reactions    Penicillins Rash       Home Medications:  Prior to Admission Medications   Prescriptions Last Dose Informant Patient Reported? Taking? amLODIPine (NORVASC) 5 mg tablet   Yes No   Sig: Take 5 mg by mouth daily. aspirin delayed-release 81 mg tablet 2/11/2020 at Unknown time  Yes Yes   Sig: Take 81 mg by mouth daily. atorvastatin (LIPITOR) 20 mg tablet   Yes No   Sig: Take 20 mg by mouth daily. losartan-hydroCHLOROthiazide (HYZAAR) 100-12.5 mg per tablet   Yes No   Sig: Take 1 Tab by mouth daily. traZODone (DESYREL) 100 mg tablet   Yes Yes   Sig: Take 100 mg by mouth nightly. Facility-Administered Medications: None       Hospital Medications:  Current Facility-Administered Medications   Medication Dose Route Frequency    sodium chloride (NS) flush 5-40 mL  5-40 mL IntraVENous Q8H    sodium chloride (NS) flush 5-40 mL  5-40 mL IntraVENous PRN    0.9% sodium chloride infusion  75 mL/hr IntraVENous CONTINUOUS    0.9% sodium chloride 2,170 mL with folic acid 1 mg, thiamine 100 mg, mvi, adult no. 4 with vit K 10 mL infusion   IntraVENous DAILY    hydrALAZINE (APRESOLINE) 20 mg/mL injection 10 mg  10 mg IntraVENous Q6H PRN       Social History:  Social History     Tobacco Use    Smoking status: Former Smoker   Substance Use Topics    Alcohol use:  Yes     Alcohol/week: 21.0 standard drinks     Types: 21 Cans of beer per week     Comment: 2-3 day       Family History:  Family History   Problem Relation Age of Onset    Colon Cancer Mother        Review of Systems:    Constitutional: negative fever, negative chills, negative weight loss  Eyes:   negative visual changes  ENT:   negative sore throat, tongue or lip swelling  Respiratory:  negative cough, negative dyspnea  Cards:  negative for chest pain, palpitations, lower extremity edema  GI:   See HPI  :  negative for frequency, dysuria  Integument:  negative for rash and pruritus  Heme:  negative for easy bruising and gum/nose bleeding  Musculoskel: negative for myalgias, back pain and muscle weakness  Neuro:  negative for headaches, dizziness, vertigo  Psych: negative for feelings of anxiety, depression      Objective:     Patient Vitals for the past 8 hrs:   BP Temp Pulse Resp SpO2   02/13/20 0739 128/71 98.1 °F (36.7 °C) 67 16 97 %   02/13/20 0431 129/74 97.5 °F (36.4 °C) 64 16 95 %   02/13/20 0400   66       No intake/output data recorded. 02/11 1901 - 02/13 0700  In: 1050 [I.V.:1050]  Out: -       PHYSICAL EXAM:  General: WD, WN. Alert, cooperative, no acute distress. HEENT: NC, Atraumatic. PERRLA, EOMI. Anicteric sclerae. Lungs:  CTA Bilaterally. No Wheezing/Rhonchi/Rales. Heart:  Regular rate and rhythm,   Abdomen: Soft, non-distended, non-tender. +Bowel sounds, no HSM  Extremities: No c/c/e  Neurologic:  Alert and oriented X 3. No acute neurological distress. Psych:   Good insight. Not anxious nor agitated.     Data Review     Recent Labs     02/13/20  0555 02/12/20  2155 02/12/20  1604   WBC 6.6  --  8.4   HGB 13.3 13.2 15.0   HCT 39.1 39.0 45.6     --  191     Recent Labs     02/13/20  0555 02/12/20  1604    141   K 3.4* 3.1*   * 101   CO2 26 30   BUN 7 10   CREA 0.89 0.94   * 109*   CA 8.3* 8.9     Recent Labs     02/12/20  1604   SGOT 27   AP 76   TP 7.9   ALB 4.1   GLOB 3.8   LPSE 235     Recent Labs     02/12/20  1604   INR 1.0   PTP 9.8        Imaging studies reviewed      Assessment:   Patient is an 67 y.o. male/female, with a past medical history of hypertension, hyperlipidemia, prediabetes was admitted for hematochezia. .  Differentials include PUD, gastritis, esophagitis, Varsha-smith tear, or GI tract neoplasms. (UPPER) --- diverticulosis, hemorrhoids, colitis (infectious vs. ischemic), IBD, AVMs, GI tract neoplasms (LOWER). 1. GI bleed- Hgb 13.3    Colonoscopy done in October (TA and diverticulosis) CT scan showed diverticulosis.       Patient Active Problem List   Diagnosis Code    Abdominal pain R10.9              Plan:   -Keep NPO  -PPI BID  -EGD  -Monitor H&H and follow clinical course for any significant changes/signs of overt bleeding  -Transfuse as necessary   -Discussed with Dr. Basim Delarosa  -Will follow along with you  -Thank you kindly for allowing us to participate in the care of this patient

## 2020-02-13 NOTE — PROGRESS NOTES
LOKI:  -EGD pending  -monitor H&H    Reason for Admission:   GI bleed                   RUR Score:   7                  Plan for utilizing home health:   Not indicated at this time                       Current Advanced Directive/Advance Care Plan: not on file, has two daughters that are next of kin                     CM met with patient at the bedside to explain role and offer support. Patient is alert and oriented x4, confirmed he lives alone and was independent with ADLs prior to admission. No history of home health, DME or rehab. Upon discharge, his son-in-law will provide transportation. Care Management Interventions  PCP Verified by CM: Yes  Palliative Care Criteria Met (RRAT>21 & CHF Dx)?: No  MyChart Signup: No  Discharge Durable Medical Equipment: No  Health Maintenance Reviewed: Yes  Physical Therapy Consult: No  Occupational Therapy Consult: No  Speech Therapy Consult: No  Current Support Network: Own Home, Lives Alone  Confirm Follow Up Transport: Family  Discharge Location  Discharge Placement: Home    Adelaida Grace

## 2020-02-13 NOTE — PERIOP NOTES
TRANSFER - OUT REPORT:    Verbal report given to karli Barron(name) on [employer] JUSTIN Albert  being transferred to [facility](unit) for routine progression of care       Report consisted of patients Situation, Background, Assessment and   Recommendations(SBAR). Information from the following report(s) Procedure Summary, Intake/Output and Recent Results was reviewed with the receiving nurse. Lines:   Peripheral IV 02/12/20 Right Forearm (Active)   Site Assessment Clean, dry, & intact 2/13/2020 10:00 AM   Phlebitis Assessment 0 2/13/2020 10:00 AM   Infiltration Assessment 0 2/13/2020 10:00 AM   Dressing Status Clean, dry, & intact 2/13/2020 10:00 AM   Dressing Type Transparent 2/13/2020 10:00 AM   Hub Color/Line Status Infusing 2/13/2020 10:00 AM   Action Taken Open ports on tubing capped 2/13/2020  1:18 AM   Alcohol Cap Used Yes 2/13/2020  1:18 AM        Opportunity for questions and clarification was provided. Transport called to take patient to [facility] unit.

## 2020-02-14 VITALS
WEIGHT: 194 LBS | DIASTOLIC BLOOD PRESSURE: 65 MMHG | OXYGEN SATURATION: 97 % | HEIGHT: 70 IN | TEMPERATURE: 97.9 F | HEART RATE: 69 BPM | RESPIRATION RATE: 18 BRPM | SYSTOLIC BLOOD PRESSURE: 138 MMHG | BODY MASS INDEX: 27.77 KG/M2

## 2020-02-14 LAB
ANION GAP SERPL CALC-SCNC: 8 MMOL/L (ref 5–15)
BUN SERPL-MCNC: 9 MG/DL (ref 6–20)
BUN/CREAT SERPL: 10 (ref 12–20)
CALCIUM SERPL-MCNC: 8.1 MG/DL (ref 8.5–10.1)
CHLORIDE SERPL-SCNC: 108 MMOL/L (ref 97–108)
CO2 SERPL-SCNC: 26 MMOL/L (ref 21–32)
CREAT SERPL-MCNC: 0.89 MG/DL (ref 0.7–1.3)
ERYTHROCYTE [DISTWIDTH] IN BLOOD BY AUTOMATED COUNT: 12.7 % (ref 11.5–14.5)
GLUCOSE SERPL-MCNC: 88 MG/DL (ref 65–100)
HCT VFR BLD AUTO: 40.1 % (ref 36.6–50.3)
HGB BLD-MCNC: 13.3 G/DL (ref 12.1–17)
MCH RBC QN AUTO: 33.2 PG (ref 26–34)
MCHC RBC AUTO-ENTMCNC: 33.2 G/DL (ref 30–36.5)
MCV RBC AUTO: 100 FL (ref 80–99)
NRBC # BLD: 0 K/UL (ref 0–0.01)
NRBC BLD-RTO: 0 PER 100 WBC
PLATELET # BLD AUTO: 166 K/UL (ref 150–400)
PMV BLD AUTO: 10.9 FL (ref 8.9–12.9)
POTASSIUM SERPL-SCNC: 3.6 MMOL/L (ref 3.5–5.1)
RBC # BLD AUTO: 4.01 M/UL (ref 4.1–5.7)
SODIUM SERPL-SCNC: 142 MMOL/L (ref 136–145)
WBC # BLD AUTO: 11.4 K/UL (ref 4.1–11.1)

## 2020-02-14 PROCEDURE — 74011250636 HC RX REV CODE- 250/636: Performed by: FAMILY MEDICINE

## 2020-02-14 PROCEDURE — 36415 COLL VENOUS BLD VENIPUNCTURE: CPT

## 2020-02-14 PROCEDURE — 85027 COMPLETE CBC AUTOMATED: CPT

## 2020-02-14 PROCEDURE — 80048 BASIC METABOLIC PNL TOTAL CA: CPT

## 2020-02-14 RX ORDER — OMEPRAZOLE 40 MG/1
40 CAPSULE, DELAYED RELEASE ORAL DAILY
Qty: 30 CAP | Refills: 0 | Status: SHIPPED | OUTPATIENT
Start: 2020-02-14

## 2020-02-14 RX ADMIN — SODIUM CHLORIDE 75 ML/HR: 900 INJECTION, SOLUTION INTRAVENOUS at 04:49

## 2020-02-14 NOTE — PROGRESS NOTES
I have personally reviewed the history and independently examined the patient. I have reviewed the chart and agree with the documentation recorded by the Mid Level Provider, including the assessment, treatment plan, and disposition. up EGD in 8 weeks    Tanisha iRchter 793 [id number] [facility] [address] [facility], [address]  [phone]                GI PROGRESS NOTE  Suly Carvalho [facility]  Work Cell: [phone]      NAME:   Cinthya Mark   :    1940   MRN:    [id number]     Assessment/Plan   1. GI bleed-   Patient describes first bloody stool than maroon and dark blood per rectum. No true melena. BUN/creatinine ratio normal.  CT bleeding scan did not demonstrate source of bleeding. EGD demonstrated chronic esophagitis but no significant ulcer to explain his bleeding. Strongly feel this bleeding is due to diverticulosis, recent colonoscopy in Oct 2019 demonstrated diverticulosis and diminutive colon polyps. -OK to discharge from GI standpoint  -Schedule an EGD in 8 weeks. -Stop alcohol (at least 8 weeks) and aspirin indefinitely.  -Take omeprazole (PRILOSEC) 40 mg capsule once daily per hospitalist               Patient Active Problem List   Diagnosis Code    Abdominal pain R10.9       Subjective:     Cinthya Mark is a 67 y.o.  male who was admitted with rectal bleeding is feeling fine and ready to go home. He had some a small amount of black stool seen on a tissue today but states his BM was brown. Patient is asymptomatic. Objective:     VITALS:   Last 24hrs VS reviewed since prior hospitalist progress note.  Most recent are:  Visit Vitals  /65 (BP 1 Location: Left arm, BP Patient Position: At rest;Head of bed elevated (Comment degrees))   Pulse 69   Temp 97.9 °F (36.6 °C)   Resp 18   Ht 5' 10\" (1.778 m)   Wt 88 kg (194 lb)   SpO2 97%   BMI 27.84 kg/m²       Intake/Output Summary (Last 24 hours) at 2020 1650  Last data filed at 2020 0342  Gross per 24 hour Intake    Output 500 ml   Net -500 ml        PHYSICAL EXAM:  General   well developed, well nourished, in no acute distress  EENT  Normocephalic, Atraumatic, PERRLA, EOMI, sclera clear  Respiratory   Clear To Auscultation bilaterally - no wheezes, rales, rhonchi, or crackles  Cardiology  Regular Rate and Rythmn  - no murmurs, rubs or gallops  Abdominal  Soft, non-tender, non-distended, positive bowel sounds, no hepatosplenomegaly, no palpable mass  Extremities  No clubbing, cyanosis, or edema. Pulses intact. Neurological  No focal neurological deficits noted  Psychological  Oriented x 3. Normal affect. Lab Data No results found for this or any previous visit (from the past 12 hour(s)).       Medications: Reviewed    PMH/SH reviewed - no change compared to H&P  Mid-Level Provider: EDUARDA Rodriguez   Date/Time:  2/14/2020

## 2020-02-14 NOTE — PROGRESS NOTES
Hospital follow-up PCP transitional care appointment has been scheduled with Dr. Vinh Barr for Thursday, 2/20/20 at 1:30 p.m. Pending patient discharge.   Guero Ware, Care Management Specialist.

## 2020-02-14 NOTE — DISCHARGE SUMMARY
Discharge Summary       PATIENT ID: Himanshu Harmon  MRN: [id number]   YOB: 1940    DATE OF ADMISSION: 2/12/2020  3:52 PM    DATE OF DISCHARGE: 2/14/2020   PRIMARY CARE PROVIDER: Delroy Jeffrey MD     ATTENDING PHYSICIAN: Dustin Jang DO   DISCHARGING PROVIDER: [address], DO    To contact this individual call [phone] and ask the  to page. If unavailable ask to be transferred the Adult Hospitalist Department. CONSULTATIONS: IP CONSULT TO GASTROENTEROLOGY  IP CONSULT TO HOSPITALIST    PROCEDURES/SURGERIES: Procedure(s):  ESOPHAGOGASTRODUODENOSCOPY (EGD)    ADMITTING DIAGNOSES & HOSPITAL COURSE:   62-year-old male presented to [facility] with maroon stool. Admitted for possible GI bleed. GI consulted. Underwent EGD and noted to have erosive distal esophagitis. Recommended PPI. Diet was advanced and tolerated by patient. He did have additional dark-colored stool but H/H remained stable and did not require transfusion. He was stable to discharge home. GI suspected possible diverticular bleed. DISCHARGE DIAGNOSES / PLAN:      GI bleed:  Acute blood loss anemia:   -Suspected diverticular bleed  -EGD with erosive distal esophagitis  -Continue PPI  -Clear liquid diet, advance as tolerated  -Outpatient follow-up EGD/colonoscopy in 8 weeks  -Hgb ~13, no transfusions needed     Hypokalemia: Replete  Hypertension: Restart home medications on discharge  Hyperlipidemia:  Restart home medications on discharge     ADDITIONAL CARE RECOMMENDATIONS:   Resume home medications. New medications:   1. Omeprazole. Take once daily in the morning. This medicine is for acid suppression and will help your esophagitis. Follow up with GI. Please stop drinking to help your esophagus heal. Avoid NSAIDs. Follow up with primary care physician within 1 week of discharge.   Recheck CBC    PENDING TEST RESULTS:   At the time of discharge the following test results are still pending: non    FOLLOW UP APPOINTMENTS:    Follow-up Information     Follow up With Specialties Details Why Contact Info    Tee Wadsworth MD [facility] On 2/20/2020 Hospital f/u PCP appointment Thursday, 2/20/20 @ 1:30 p.m.  [phone] [phone] [place] [phone]      Chin Bello MD Gastroenterology  Please call for follow up appointment in 8 weeks. Will need repeat EGD Bhumika Leiva  [phone]               DISCHARGE MEDICATIONS:  Current Discharge Medication List      START taking these medications    Details   omeprazole (PRILOSEC) 40 mg capsule Take 1 Cap by mouth daily. Qty: 30 Cap, Refills: 0         CONTINUE these medications which have NOT CHANGED    Details   traZODone (DESYREL) 100 mg tablet Take 100 mg by mouth nightly. aspirin delayed-release 81 mg tablet Take 81 mg by mouth daily. losartan-hydroCHLOROthiazide (HYZAAR) 100-12.5 mg per tablet Take 1 Tab by mouth daily. amLODIPine (NORVASC) 5 mg tablet Take 5 mg by mouth daily. atorvastatin (LIPITOR) 20 mg tablet Take 20 mg by mouth daily. NOTIFY YOUR PHYSICIAN FOR ANY OF THE FOLLOWING:   Fever over 101 degrees for 24 hours. Chest pain, shortness of breath, fever, chills, nausea, vomiting, diarrhea, change in mentation, falling, weakness, bleeding. Severe pain or pain not relieved by medications. Or, any other signs or symptoms that you may have questions about.     DISPOSITION:  x  Home With:   OT  PT  HH  RN       Long term SNF/Inpatient Rehab    Independent/assisted living    Hospice    Other:       PATIENT CONDITION AT DISCHARGE:     Functional status    Poor     Deconditioned    x Independent      Cognition   x  Lucid     Forgetful     Dementia      Catheters/lines (plus indication)    Cruz     PICC     PEG    x None      Code status    x Full code     DNR      PHYSICAL EXAMINATION AT DISCHARGE:  Constitutional:  No acute distress, cooperative, pleasant    ENT:  Oral mucosa moist, oropharynx benign. Resp:  CTA bilaterally. No wheezing/rhonchi/rales. No accessory muscle use   CV:  Regular rhythm, normal rate, no murmurs, gallops, rubs    GI:  Soft, non distended, non tender. normoactive bowel sounds, no hepatosplenomegaly     Musculoskeletal:  No edema, warm, 2+ pulses throughout    Neurologic:  Moves all extremities.                         CHRONIC MEDICAL DIAGNOSES:  Problem List as of 2/14/2020 Never Reviewed          Codes Class Noted - Resolved    Abdominal pain ICD-10-CM: R10.9  ICD-9-CM: 789.00  2/12/2020 - Present              Greater than 30 minutes were spent with the patient on counseling and coordination of care    Signed:   Ivon Montes De Oca DO  2/14/2020  10:26 AM

## 2020-02-14 NOTE — ANESTHESIA POSTPROCEDURE EVALUATION
Procedure(s):  ESOPHAGOGASTRODUODENOSCOPY (EGD).     MAC    <BSHSIANPOST>    Vitals Value Taken Time   /65 2/14/2020  8:32 AM   Temp 36.6 °C (97.9 °F) 2/14/2020  8:32 AM   Pulse 69 2/14/2020  8:32 AM   Resp 18 2/14/2020  8:32 AM   SpO2 97 % 2/14/2020  8:32 AM

## 2020-02-14 NOTE — PROGRESS NOTES
Discharge instructions reviewed with patient and family,the they had no further questions. declined wheelchair out of hospital and ambulated off of unit.

## 2022-03-19 PROBLEM — R10.9 ABDOMINAL PAIN: Status: ACTIVE | Noted: 2020-02-12

## 2025-05-19 ENCOUNTER — ANESTHESIA EVENT (OUTPATIENT)
Facility: HOSPITAL | Age: 85
End: 2025-05-19
Payer: MEDICARE

## 2025-05-19 ENCOUNTER — HOSPITAL ENCOUNTER (OUTPATIENT)
Facility: HOSPITAL | Age: 85
Setting detail: OUTPATIENT SURGERY
Discharge: HOME | End: 2025-05-19
Attending: INTERNAL MEDICINE | Admitting: INTERNAL MEDICINE
Payer: MEDICARE

## 2025-05-19 ENCOUNTER — ANESTHESIA (OUTPATIENT)
Facility: HOSPITAL | Age: 85
End: 2025-05-19
Payer: MEDICARE

## 2025-05-19 VITALS
DIASTOLIC BLOOD PRESSURE: 82 MMHG | WEIGHT: 190 LBS | SYSTOLIC BLOOD PRESSURE: 158 MMHG | HEIGHT: 71 IN | RESPIRATION RATE: 26 BRPM | HEART RATE: 67 BPM | BODY MASS INDEX: 26.6 KG/M2 | TEMPERATURE: 98 F | OXYGEN SATURATION: 98 %

## 2025-05-19 PROCEDURE — 3600007502: Performed by: INTERNAL MEDICINE

## 2025-05-19 PROCEDURE — 7100000011 HC PHASE II RECOVERY - ADDTL 15 MIN: Performed by: INTERNAL MEDICINE

## 2025-05-19 PROCEDURE — 3700000000 HC ANESTHESIA ATTENDED CARE: Performed by: INTERNAL MEDICINE

## 2025-05-19 PROCEDURE — 7100000010 HC PHASE II RECOVERY - FIRST 15 MIN: Performed by: INTERNAL MEDICINE

## 2025-05-19 PROCEDURE — 2709999900 HC NON-CHARGEABLE SUPPLY: Performed by: INTERNAL MEDICINE

## 2025-05-19 PROCEDURE — 2580000003 HC RX 258: Performed by: NURSE ANESTHETIST, CERTIFIED REGISTERED

## 2025-05-19 PROCEDURE — 6360000002 HC RX W HCPCS: Performed by: NURSE ANESTHETIST, CERTIFIED REGISTERED

## 2025-05-19 RX ORDER — SODIUM CHLORIDE 9 MG/ML
INJECTION, SOLUTION INTRAVENOUS PRN
Status: DISCONTINUED | OUTPATIENT
Start: 2025-05-19 | End: 2025-05-19 | Stop reason: HOSPADM

## 2025-05-19 RX ORDER — AMLODIPINE BESYLATE 5 MG/1
5 TABLET ORAL DAILY
COMMUNITY

## 2025-05-19 RX ORDER — ATORVASTATIN CALCIUM 20 MG/1
20 TABLET, FILM COATED ORAL DAILY
COMMUNITY

## 2025-05-19 RX ORDER — SODIUM CHLORIDE 9 MG/ML
INJECTION, SOLUTION INTRAVENOUS
Status: DISCONTINUED | OUTPATIENT
Start: 2025-05-19 | End: 2025-05-19 | Stop reason: SDUPTHER

## 2025-05-19 RX ORDER — LOSARTAN POTASSIUM AND HYDROCHLOROTHIAZIDE 12.5; 1 MG/1; MG/1
1 TABLET ORAL DAILY
COMMUNITY

## 2025-05-19 RX ORDER — SODIUM CHLORIDE 9 MG/ML
INJECTION, SOLUTION INTRAVENOUS CONTINUOUS
Status: DISCONTINUED | OUTPATIENT
Start: 2025-05-19 | End: 2025-05-19 | Stop reason: HOSPADM

## 2025-05-19 RX ORDER — SODIUM CHLORIDE 0.9 % (FLUSH) 0.9 %
5-40 SYRINGE (ML) INJECTION EVERY 12 HOURS SCHEDULED
Status: DISCONTINUED | OUTPATIENT
Start: 2025-05-19 | End: 2025-05-19 | Stop reason: HOSPADM

## 2025-05-19 RX ORDER — SODIUM CHLORIDE 0.9 % (FLUSH) 0.9 %
5-40 SYRINGE (ML) INJECTION PRN
Status: DISCONTINUED | OUTPATIENT
Start: 2025-05-19 | End: 2025-05-19 | Stop reason: HOSPADM

## 2025-05-19 RX ORDER — ERYTHROMYCIN 5 MG/G
3.5 OINTMENT OPHTHALMIC EVERY 6 HOURS
COMMUNITY
Start: 2025-03-03

## 2025-05-19 RX ADMIN — SODIUM CHLORIDE: 9 INJECTION, SOLUTION INTRAVENOUS at 12:45

## 2025-05-19 RX ADMIN — PROPOFOL 40 MG: 10 INJECTION, EMULSION INTRAVENOUS at 13:57

## 2025-05-19 RX ADMIN — PROPOFOL 70 MG: 10 INJECTION, EMULSION INTRAVENOUS at 13:54

## 2025-05-19 ASSESSMENT — PAIN - FUNCTIONAL ASSESSMENT: PAIN_FUNCTIONAL_ASSESSMENT: NONE - DENIES PAIN

## 2025-05-19 NOTE — PROGRESS NOTES

## 2025-05-19 NOTE — ANESTHESIA PRE PROCEDURE
Department of Anesthesiology  Preprocedure Note       Name:  Nathanael Good   Age:  84 y.o.  :  1940                                          MRN:  [id number]         Date:  2025      Surgeon: Surgeon(s):  Alonso Jones MD    Procedure: Procedure(s):  COLONOSCOPY DIAGNOSTIC    Medications prior to admission:   Prior to Admission medications    Medication Sig Start Date End Date Taking? Authorizing Provider   amLODIPine (NORVASC) 5 MG tablet Take 1 tablet by mouth daily   Yes ProviderTiarra MD   atorvastatin (LIPITOR) 20 MG tablet Take 1 tablet by mouth daily   Yes ProviderTiarra MD   erythromycin (ROMYCIN) 5 MG/GM ophthalmic ointment Place 35 cm into both eyes every 6 hours 3/3/25  Yes ProviderTiarra MD   losartan-hydroCHLOROthiazide (HYZAAR) 100-12.5 MG per tablet Take 1 tablet by mouth daily   Yes ProviderTiarra MD       Current medications:    No current facility-administered medications for this encounter.     Facility-Administered Medications Ordered in Other Encounters   Medication Dose Route Frequency Provider Last Rate Last Admin   • 0.9 % sodium chloride infusion   IntraVENous Continuous PRN Louie Mcguire APRN - CRNA   New Bag at 25 1245       Allergies:    Allergies   Allergen Reactions   • Penicillins Rash       Problem List:    Patient Active Problem List   Diagnosis Code   • Abdominal pain R10.9       Past Medical History:        Diagnosis Date   • High cholesterol    • Hypertension    • Prediabetes    • Serosal tear of colon        Past Surgical History:        Procedure Laterality Date   • COLONOSCOPY Left 10/28/2019    COLONOSCOPY performed by Michael Lopez MD at [facility] ENDOSCOPY   • ORTHOPEDIC SURGERY      bilater toe removal in trh 80s       Social History:    Social History     Tobacco Use   • Smoking status: Former   • Smokeless tobacco: Not on file   Substance Use Topics   • Alcohol use: Yes     Alcohol/week: 21.0 standard drinks of

## 2025-05-19 NOTE — OP NOTE
[facility]  [address]  [place] [address] [phone]                              Colonoscopy Procedure Note      Indications:    Personal history of colon polyps (screening only)     :  Alonso Jones MD    Surgical Assistant: Wadeulator: Tiny Quintanilla RN  Endoscopy Technician: Robe Pandey    Implants: none    Referring Provider: Roque Grover MD    Sedation:  MAC anesthesia    Procedure Details:  After informed consent was obtained with all risks and benefits of procedure explained and preoperative exam completed, the patient was taken to the endoscopy suite and placed in the left lateral decubitus position.  Upon sequential sedation as per above, a digital rectal exam was performed  And was normal.  The Olympus videocolonoscope  was inserted in the rectum and carefully advanced to the sigmoid colon.  The quality of preparation was inadequate.  The colonoscope was slowly withdrawn with careful evaluation between folds. Retroflexion in the rectum was performed and was normal..     Findings:   Rectum: no mucosal lesion appreciated  stool present;;  Sigmoid: no mucosal lesion appreciated  stool present;;      -Diverticulosis  Descending Colon: not intubated  Transverse Colon: not intubated  Ascending Colon: not intubated  Cecum: not intubated  Terminal Ileum: not intubated    Interventions:  none    Specimen Removed:  * No specimens in log *    Complications: None.     EBL:  None.    Recommendations:   -High fiber diet.     -Resume normal medication(s).   -Would not recommend further colonoscopy for screening purposes at this point.  However, may consider Cologuard test    Discharge Disposition:  Home in the company of a  when able to ambulate.    Alonso Jones MD  5/19/2025  2:10 PM

## 2025-05-19 NOTE — H&P
[facility]  [address]  [place] [address] [phone]                                History and Physical     NAME: [place] DREAD Good   :  1940   MRN:  [id number]     HPI:  The patient was seen and examined.    Past Surgical History:   Procedure Laterality Date    COLONOSCOPY Left 10/28/2019    COLONOSCOPY performed by Michael Lopez MD at [facility] ENDOSCOPY    ORTHOPEDIC SURGERY      bilater toe removal in trhe 80's     Past Medical History:   Diagnosis Date    High cholesterol     Hypertension     Prediabetes      Social History     Tobacco Use    Smoking status: Former   Substance Use Topics    Alcohol use: Yes     Alcohol/week: 21.0 standard drinks of alcohol     Allergies   Allergen Reactions    Penicillins      Family History   Problem Relation Age of Onset    Colon Cancer Mother      No current facility-administered medications for this encounter.         PHYSICAL EXAM:  General: WD, WN. Alert, cooperative, no acute distress    HEENT: NC, Atraumatic.  PERRLA, EOMI. Anicteric sclerae.  Lungs:  CTA Bilaterally. No Wheezing/Rhonchi/Rales.  Heart:  Regular  rhythm,  No murmur, No Rubs, No Gallops  Abdomen: Soft, Non distended, Non tender.  +Bowel sounds, no HSM  Extremities: No c/c/e  Neurologic:  CN 2-12 gi, Alert and oriented X 3.  No acute neurological distress   Psych:   Good insight. Not anxious nor agitated.    The heart, lungs and mental status were satisfactory for the administration of MAC sedation and for the procedure.      Mallampati score: 2     The patient was counseled at length about the risks of edwin Covid-19 in the omar-operative and post-operative states including the recovery window of their procedure.  The patient was made aware that edwin Covid-19 after a surgical procedure may worsen their prognosis for recovering from the virus and lend to a higher morbidity and or mortality risk.  The patient was given the options of postponing their

## 2025-05-19 NOTE — ANESTHESIA POSTPROCEDURE EVALUATION
Department of Anesthesiology  Postprocedure Note    Patient: Nathanael Good  MRN: [id number]  YOB: 1940  Date of evaluation: 5/19/2025    Procedure Summary       Date: 05/19/25 Room / Location: [facility] ENDO 01 / [facility] ENDOSCOPY    Anesthesia Start: 1347 Anesthesia Stop: 1402    Procedure: COLONOSCOPY DIAGNOSTIC (Lower GI Region) Diagnosis:       History of colon polyps      (History of colon polyps [Z86.0100])    Surgeons: Alonso Jones MD Responsible Provider: Anibal Shaw MD    Anesthesia Type: MAC ASA Status: 2            Anesthesia Type: MAC    Mayi Phase I: Mayi Score: 10    Mayi Phase II:      Anesthesia Post Evaluation    Patient location during evaluation: bedside  Nausea & Vomiting: no nausea  Cardiovascular status: blood pressure returned to baseline  Respiratory status: acceptable  Hydration status: euvolemic    No notable events documented.

## 2025-05-19 NOTE — DISCHARGE INSTRUCTIONS
[facility]  [address]  [place] [address]  [phone]                                  [place] DREAD Good  [id number]  1940    COLON DISCHARGE INSTRUCTIONS    DISCOMFORT:  Redness at IV site- apply warm compress to area; if redness or soreness persist- contact your physician  There may be a slight amount of blood passed from the rectum  Gaseous discomfort- walking, belching will help relieve any discomfort      DIET:   High fiber diet.   - however -  remember your colon is empty and a heavy meal will produce gas.   Avoid these foods:  vegetables, fried / greasy foods, carbonated drinks for today  You may not  drink alcoholic beverages for at least 12 hours     ACTIVITY:  It is recommended that you spend the remainder of the day resting -  avoid any strenuous activity.  You may not operate a vehicle for 12 hours  You may not  engage in an occupation involving machinery or appliances for rest of today    Avoid making any critical decisions for at least 24 hour    CALL M.D.  ANY SIGN OF:   Increasing pain, nausea, vomiting  Abdominal distension (swelling)  New increased bleeding (oral or rectal)  Fever (chills)  Pain in chest area  Bloody discharge from nose or mouth  Shortness of breath    You may not  take any Advil, Aspirin, Ibuprofen, Motrin, Aleve, or Goody’s for 3 days, ONLY  Tylenol as needed for pain.    Post procedure diagnosis:   Diverticulosis  Inadequate prep    Post-procedure recommendations:  -High fiber diet.     -Resume normal medication(s).   -Would not recommend further colonoscopy for screening purposes at this point.  However, may consider Cologuard test    Follow-up Instructions:    Call Dr. Jones for any questions or problems.     If we took a biopsy please call the office within 2 weeks to discuss your  pathology results. Telephone # [phone]         Learning About Coronavirus (COVID-19)  Coronavirus (COVID-19): Overview  What is coronavirus

## 2025-05-19 NOTE — ANESTHESIA POSTPROCEDURE EVALUATION
Department of Anesthesiology  Postprocedure Note    Patient: Nathanael Good  MRN: [id number]  YOB: 1940  Date of evaluation: 5/19/2025    Procedure Summary       Date: 05/19/25 Room / Location: [facility] ENDO 01 / [facility] ENDOSCOPY    Anesthesia Start: 1347 Anesthesia Stop: 1402    Procedure: COLONOSCOPY DIAGNOSTIC (Lower GI Region) Diagnosis:       History of colon polyps      (History of colon polyps [Z86.0100])    Surgeons: Alonso Jones MD Responsible Provider: Anibal Shaw MD    Anesthesia Type: MAC ASA Status: 2            Anesthesia Type: MAC    Mayi Phase I: Mayi Score: 10    Mayi Phase II:      Anesthesia Post Evaluation    Patient location during evaluation: bedside  Nausea & Vomiting: no nausea  Cardiovascular status: blood pressure returned to baseline  Respiratory status: acceptable  Hydration status: euvolemic    No notable events documented.

## (undated) DEVICE — TUBING IRRIG COMPATIBLE W ERBE MEDIVATOR PMP HYDR

## (undated) DEVICE — SNARE VASC L240CM LOOP W10MM SHTH DIA2.4MM RND STIFF CLD

## (undated) DEVICE — TRAP SURG QUAD PARABOLA SLOT DSGN SFTY SCRN TRAPEASE

## (undated) DEVICE — TUBING HYDR IRR --

## (undated) DEVICE — SUPPLEMENT DIGESTIVE H2O SOL GI-EASE

## (undated) DEVICE — FORCEPS BX L240CM JAW DIA2.8MM L CAP W/ NDL MIC MESH TOOTH